# Patient Record
Sex: MALE | Race: WHITE
[De-identification: names, ages, dates, MRNs, and addresses within clinical notes are randomized per-mention and may not be internally consistent; named-entity substitution may affect disease eponyms.]

---

## 2022-03-18 PROBLEM — B35.1 OM (ONYCHOMYCOSIS): Status: ACTIVE | Noted: 2020-01-20

## 2022-03-19 PROBLEM — E78.5 HLD (HYPERLIPIDEMIA): Status: ACTIVE | Noted: 2021-08-31

## 2022-03-19 PROBLEM — L70.9 ACNE: Status: ACTIVE | Noted: 2020-01-20

## 2022-03-19 PROBLEM — R55 VASOVAGAL SYMPTOM: Status: ACTIVE | Noted: 2020-01-20

## 2022-03-19 PROBLEM — Z00.00 ANNUAL PHYSICAL EXAM: Status: ACTIVE | Noted: 2018-04-03

## 2022-03-20 PROBLEM — Z80.42 FAMILY HISTORY OF PROSTATE CANCER IN FATHER: Status: ACTIVE | Noted: 2018-04-26

## 2022-03-20 PROBLEM — I10 HTN (HYPERTENSION): Status: ACTIVE | Noted: 2018-04-03

## 2022-03-24 PROBLEM — M19.90 OA (OSTEOARTHRITIS): Status: ACTIVE | Noted: 2022-03-22

## 2022-03-24 PROBLEM — E66.9 OBESITY (BMI 30.0-34.9): Status: ACTIVE | Noted: 2022-03-22

## 2022-03-24 PROBLEM — E66.811 OBESITY (BMI 30.0-34.9): Status: ACTIVE | Noted: 2022-03-22

## 2022-09-15 DIAGNOSIS — I10 HYPERTENSION, UNSPECIFIED TYPE: ICD-10-CM

## 2022-09-15 DIAGNOSIS — E78.5 HYPERLIPIDEMIA, UNSPECIFIED HYPERLIPIDEMIA TYPE: ICD-10-CM

## 2022-09-15 DIAGNOSIS — I10 HYPERTENSION, UNSPECIFIED TYPE: Primary | ICD-10-CM

## 2022-09-15 LAB
ALBUMIN SERPL-MCNC: 4.1 G/DL (ref 3.5–5)
ALBUMIN/GLOB SERPL: 1.2 {RATIO} (ref 1.2–3.5)
ALP SERPL-CCNC: 61 U/L (ref 50–136)
ALT SERPL-CCNC: 35 U/L (ref 12–65)
ANION GAP SERPL CALC-SCNC: 3 MMOL/L (ref 4–13)
AST SERPL-CCNC: 14 U/L (ref 15–37)
BILIRUB SERPL-MCNC: 0.5 MG/DL (ref 0.2–1.1)
BUN SERPL-MCNC: 11 MG/DL (ref 6–23)
CALCIUM SERPL-MCNC: 9.5 MG/DL (ref 8.3–10.4)
CHLORIDE SERPL-SCNC: 106 MMOL/L (ref 101–110)
CHOLEST SERPL-MCNC: 126 MG/DL
CO2 SERPL-SCNC: 28 MMOL/L (ref 21–32)
CREAT SERPL-MCNC: 0.9 MG/DL (ref 0.8–1.5)
GLOBULIN SER CALC-MCNC: 3.3 G/DL (ref 2.3–3.5)
GLUCOSE SERPL-MCNC: 98 MG/DL (ref 65–100)
HDLC SERPL-MCNC: 31 MG/DL (ref 40–60)
HDLC SERPL: 4.1 {RATIO}
LDLC SERPL CALC-MCNC: 59 MG/DL
POTASSIUM SERPL-SCNC: 4.1 MMOL/L (ref 3.5–5.1)
PROT SERPL-MCNC: 7.4 G/DL (ref 6.3–8.2)
SODIUM SERPL-SCNC: 137 MMOL/L (ref 138–145)
TRIGL SERPL-MCNC: 180 MG/DL (ref 35–150)
VLDLC SERPL CALC-MCNC: 36 MG/DL (ref 6–23)

## 2022-09-22 ENCOUNTER — TELEMEDICINE (OUTPATIENT)
Dept: FAMILY MEDICINE CLINIC | Facility: CLINIC | Age: 37
End: 2022-09-22
Payer: OTHER GOVERNMENT

## 2022-09-22 DIAGNOSIS — Z12.5 SCREENING FOR PROSTATE CANCER: ICD-10-CM

## 2022-09-22 DIAGNOSIS — Z80.42 FAMILY HISTORY OF PROSTATE CANCER IN FATHER: ICD-10-CM

## 2022-09-22 DIAGNOSIS — E78.5 HYPERLIPIDEMIA, UNSPECIFIED HYPERLIPIDEMIA TYPE: ICD-10-CM

## 2022-09-22 DIAGNOSIS — I10 HYPERTENSION, UNSPECIFIED TYPE: Primary | ICD-10-CM

## 2022-09-22 DIAGNOSIS — M15.9 OSTEOARTHRITIS OF MULTIPLE JOINTS, UNSPECIFIED OSTEOARTHRITIS TYPE: ICD-10-CM

## 2022-09-22 PROCEDURE — 99214 OFFICE O/P EST MOD 30 MIN: CPT | Performed by: FAMILY MEDICINE

## 2022-09-22 RX ORDER — LISINOPRIL AND HYDROCHLOROTHIAZIDE 12.5; 1 MG/1; MG/1
1 TABLET ORAL DAILY
Qty: 90 TABLET | Refills: 1 | Status: SHIPPED | OUTPATIENT
Start: 2022-09-22

## 2022-09-22 RX ORDER — MELOXICAM 15 MG/1
15 TABLET ORAL DAILY PRN
Qty: 30 TABLET | Refills: 5
Start: 2022-09-22

## 2022-09-22 RX ORDER — AZELASTINE 1 MG/ML
SPRAY, METERED NASAL
COMMUNITY
Start: 2020-03-04

## 2022-09-22 NOTE — PROGRESS NOTES
[address]  Phone: [phone]  Fax: [phone]  [email or web address]      Encounter Info  Fernando Richardson; Established patient 40 y.o.male; seen 9/22/2022 for: Hypertension      Assessment & Plan    1. Hypertension, unspecified type  -     lisinopril-hydroCHLOROthiazide (PRINZIDE;ZESTORETIC) 10-12.5 MG per tablet; Take 1 tablet by mouth daily, Disp-90 tablet, R-1Normal  -     Comprehensive Metabolic Panel; Future  2. Osteoarthritis of multiple joints, unspecified osteoarthritis type  -     meloxicam (MOBIC) 15 MG tablet; Take 1 tablet by mouth daily as needed for Pain, Disp-30 tablet, R-5NO PRINT  3. Hyperlipidemia, unspecified hyperlipidemia type  -     Lipid Panel; Future  4. Family history of prostate cancer in father  -     PSA Screening; Future  5. Screening for prostate cancer  -     PSA Screening; Future    Problem and/or Symptoms are currently stable and/or improving on current treatment plan. Will continue and have patient follow up as directed. Pertinent labs reviewed & pertinent meds refilled X 6 M      Check Out Instructions  Return in about 6 months (around 3/22/2023) for Physical, Previsit Labs. Subjective & Objective    HPI  Patient states that chronic health problems are stable on current regimens and has no acute concerns today except as mentioned. Review of Systems     Physical Exam  Patient-Reported Vitals 9/22/2022   Patient-Reported Weight 225   Patient-Reported Height 5'10\"   Patient-Reported Systolic 349   Patient-Reported Diastolic 75   Patient-Reported Pulse -   Patient-Reported Temperature -   Patient-Reported SpO2 -   Patient-Reported Peak Flow -       BP Readings from Last 3 Encounters:   03/22/22 118/73     Wt Readings from Last 3 Encounters:   03/22/22 229 lb 12.8 oz (104.2 kg)     There is no height or weight on file to calculate BMI.      We discussed the typical prognosis and potential complications of the concern(s), including treatment options. Medication risks, benefits, costs, interactions, and alternatives were discussed as appropriate. I advised him to contact the office if his condition worsens or fails to improve as anticipated. He expressed understanding with the discussion and plan of care. Amie Black, was evaluated through a synchronous (real-time) audio and/or video encounter. The patient (or guardian if applicable) is aware that this is a billable service, which includes applicable co-pays. This Virtual Visit was conducted with patient's (and/or legal guardian's) consent. The visit was conducted pursuant to the emergency declaration under the [address] [place] [address], [address] [place] [address] authority and the [employer] General Act. Patient identification was verified, and a caregiver was present when appropriate. The patient was located at Home: [place] [address]. Provider was located at [facility] (Appt Dept): [id number] ,  [address]. An electronic signature was used to authenticate this note.   -- Toro Gilbert MD

## 2022-12-08 DIAGNOSIS — I10 HYPERTENSION, UNSPECIFIED TYPE: ICD-10-CM

## 2022-12-08 RX ORDER — LISINOPRIL AND HYDROCHLOROTHIAZIDE 12.5; 1 MG/1; MG/1
1 TABLET ORAL DAILY
Qty: 90 TABLET | Refills: 0 | Status: SHIPPED | OUTPATIENT
Start: 2022-12-08

## 2023-03-17 DIAGNOSIS — I10 HYPERTENSION, UNSPECIFIED TYPE: ICD-10-CM

## 2023-03-17 NOTE — TELEPHONE ENCOUNTER
Pt requesting a refill on medication, I will notify him that he need to schedule his pre-lab and CPX.

## 2023-03-20 RX ORDER — LISINOPRIL AND HYDROCHLOROTHIAZIDE 12.5; 1 MG/1; MG/1
1 TABLET ORAL DAILY
Qty: 30 TABLET | Refills: 0 | Status: SHIPPED | OUTPATIENT
Start: 2023-03-20

## 2023-04-19 ENCOUNTER — TELEMEDICINE (OUTPATIENT)
Dept: FAMILY MEDICINE CLINIC | Facility: CLINIC | Age: 38
End: 2023-04-19
Payer: OTHER GOVERNMENT

## 2023-04-19 DIAGNOSIS — I10 HYPERTENSION, UNSPECIFIED TYPE: Primary | ICD-10-CM

## 2023-04-19 DIAGNOSIS — E78.5 HYPERLIPIDEMIA, UNSPECIFIED HYPERLIPIDEMIA TYPE: ICD-10-CM

## 2023-04-19 DIAGNOSIS — M15.9 OSTEOARTHRITIS OF MULTIPLE JOINTS, UNSPECIFIED OSTEOARTHRITIS TYPE: ICD-10-CM

## 2023-04-19 DIAGNOSIS — J30.9 ALLERGIC RHINITIS, UNSPECIFIED SEASONALITY, UNSPECIFIED TRIGGER: ICD-10-CM

## 2023-04-19 PROCEDURE — 99214 OFFICE O/P EST MOD 30 MIN: CPT | Performed by: FAMILY MEDICINE

## 2023-04-19 RX ORDER — MELOXICAM 15 MG/1
15 TABLET ORAL DAILY PRN
Qty: 30 TABLET | Refills: 5 | Status: CANCELLED | OUTPATIENT
Start: 2023-04-19

## 2023-04-19 RX ORDER — AZELASTINE 1 MG/ML
1 SPRAY, METERED NASAL 2 TIMES DAILY
Qty: 30 ML | Refills: 5 | Status: SHIPPED | OUTPATIENT
Start: 2023-04-19

## 2023-04-19 RX ORDER — LISINOPRIL AND HYDROCHLOROTHIAZIDE 12.5; 1 MG/1; MG/1
1 TABLET ORAL DAILY
Qty: 90 TABLET | Refills: 1 | Status: SHIPPED | OUTPATIENT
Start: 2023-04-19

## 2023-04-19 NOTE — PROGRESS NOTES
12/31/19 227 lb 9.6 oz (103.2 kg)     There is no height or weight on file to calculate BMI. PHQ-9  3/22/2022   Little interest or pleasure in doing things -   Little interest or pleasure in doing things 0   Feeling down, depressed, or hopeless -   PHQ-2 Score -   Total Score PHQ 2 0   PHQ-9 Total Score -        We discussed the typical prognosis and potential complications of the concern(s), including treatment options. Medication risks, benefits, costs, interactions, and alternatives were discussed as appropriate. I advised him to contact the office if his condition worsens or fails to improve as anticipated. He expressed understanding with the discussion and plan of care. Félix Granado, was evaluated through a synchronous (real-time) audio and/or video encounter. The patient (or guardian if applicable) is aware that this is a billable service, which includes applicable co-pays. This Virtual Visit was conducted with patient's (and/or legal guardian's) consent. The visit was conducted pursuant to the emergency declaration under the [address] [place] [address], [address] [place] [address] authority and the [employer] General Act. Patient identification was verified, and a caregiver was present when appropriate. The patient was located at Home: [place] [address]. Provider was located at [facility] (Appt Dept): [id number]  [facility]. An electronic signature was used to authenticate this note.   -- Ghanshyam Hui MD

## 2023-04-19 NOTE — PATIENT INSTRUCTIONS

## 2023-10-10 DIAGNOSIS — E78.5 HYPERLIPIDEMIA, UNSPECIFIED HYPERLIPIDEMIA TYPE: ICD-10-CM

## 2023-10-10 DIAGNOSIS — I10 HYPERTENSION, UNSPECIFIED TYPE: ICD-10-CM

## 2023-10-10 LAB
ALBUMIN SERPL-MCNC: 4.1 G/DL (ref 3.5–5)
ALBUMIN/GLOB SERPL: 1.1 (ref 0.4–1.6)
ALP SERPL-CCNC: 62 U/L (ref 50–136)
ALT SERPL-CCNC: 43 U/L (ref 12–65)
ANION GAP SERPL CALC-SCNC: 9 MMOL/L (ref 2–11)
AST SERPL-CCNC: 18 U/L (ref 15–37)
BILIRUB SERPL-MCNC: 0.9 MG/DL (ref 0.2–1.1)
BUN SERPL-MCNC: 9 MG/DL (ref 6–23)
CALCIUM SERPL-MCNC: 9.3 MG/DL (ref 8.3–10.4)
CHLORIDE SERPL-SCNC: 106 MMOL/L (ref 101–110)
CHOLEST SERPL-MCNC: 145 MG/DL
CO2 SERPL-SCNC: 26 MMOL/L (ref 21–32)
CREAT SERPL-MCNC: 1 MG/DL (ref 0.8–1.5)
GLOBULIN SER CALC-MCNC: 3.8 G/DL (ref 2.8–4.5)
GLUCOSE SERPL-MCNC: 107 MG/DL (ref 65–100)
HDLC SERPL-MCNC: 29 MG/DL (ref 40–60)
HDLC SERPL: 5
LDLC SERPL CALC-MCNC: 78.8 MG/DL
POTASSIUM SERPL-SCNC: 3.9 MMOL/L (ref 3.5–5.1)
PROT SERPL-MCNC: 7.9 G/DL (ref 6.3–8.2)
SODIUM SERPL-SCNC: 141 MMOL/L (ref 133–143)
TRIGL SERPL-MCNC: 186 MG/DL (ref 35–150)
VLDLC SERPL CALC-MCNC: 37.2 MG/DL (ref 6–23)

## 2023-10-16 ASSESSMENT — PATIENT HEALTH QUESTIONNAIRE - PHQ9
1. LITTLE INTEREST OR PLEASURE IN DOING THINGS: NOT AT ALL
SUM OF ALL RESPONSES TO PHQ QUESTIONS 1-9: 0
2. FEELING DOWN, DEPRESSED OR HOPELESS: NOT AT ALL
SUM OF ALL RESPONSES TO PHQ QUESTIONS 1-9: 0
SUM OF ALL RESPONSES TO PHQ QUESTIONS 1-9: 0
SUM OF ALL RESPONSES TO PHQ9 QUESTIONS 1 & 2: 0
2. FEELING DOWN, DEPRESSED OR HOPELESS: 0
SUM OF ALL RESPONSES TO PHQ9 QUESTIONS 1 & 2: 0
SUM OF ALL RESPONSES TO PHQ QUESTIONS 1-9: 0
1. LITTLE INTEREST OR PLEASURE IN DOING THINGS: 0

## 2023-10-19 ENCOUNTER — OFFICE VISIT (OUTPATIENT)
Dept: FAMILY MEDICINE CLINIC | Facility: CLINIC | Age: 38
End: 2023-10-19
Payer: OTHER GOVERNMENT

## 2023-10-19 VITALS
HEART RATE: 73 BPM | TEMPERATURE: 97.3 F | BODY MASS INDEX: 32.73 KG/M2 | SYSTOLIC BLOOD PRESSURE: 105 MMHG | WEIGHT: 228.6 LBS | DIASTOLIC BLOOD PRESSURE: 62 MMHG | HEIGHT: 70 IN

## 2023-10-19 DIAGNOSIS — R73.02 IGT (IMPAIRED GLUCOSE TOLERANCE): ICD-10-CM

## 2023-10-19 DIAGNOSIS — I10 HYPERTENSION, UNSPECIFIED TYPE: ICD-10-CM

## 2023-10-19 DIAGNOSIS — E78.5 HYPERLIPIDEMIA, UNSPECIFIED HYPERLIPIDEMIA TYPE: ICD-10-CM

## 2023-10-19 DIAGNOSIS — J30.9 ALLERGIC RHINITIS, UNSPECIFIED SEASONALITY, UNSPECIFIED TRIGGER: ICD-10-CM

## 2023-10-19 DIAGNOSIS — Z00.00 ANNUAL PHYSICAL EXAM: Primary | ICD-10-CM

## 2023-10-19 DIAGNOSIS — E66.9 OBESITY (BMI 30.0-34.9): ICD-10-CM

## 2023-10-19 PROCEDURE — 99395 PREV VISIT EST AGE 18-39: CPT | Performed by: FAMILY MEDICINE

## 2023-10-19 PROCEDURE — 99214 OFFICE O/P EST MOD 30 MIN: CPT | Performed by: FAMILY MEDICINE

## 2023-10-19 PROCEDURE — 3078F DIAST BP <80 MM HG: CPT | Performed by: FAMILY MEDICINE

## 2023-10-19 PROCEDURE — 3074F SYST BP LT 130 MM HG: CPT | Performed by: FAMILY MEDICINE

## 2023-10-19 RX ORDER — LISINOPRIL AND HYDROCHLOROTHIAZIDE 12.5; 1 MG/1; MG/1
1 TABLET ORAL DAILY
Qty: 100 TABLET | Refills: 1 | Status: SHIPPED | OUTPATIENT
Start: 2023-10-19

## 2023-10-19 RX ORDER — AZELASTINE 1 MG/ML
1 SPRAY, METERED NASAL 2 TIMES DAILY
Qty: 30 ML | Refills: 5 | Status: CANCELLED | OUTPATIENT
Start: 2023-10-19

## 2023-10-19 SDOH — ECONOMIC STABILITY: HOUSING INSECURITY
IN THE LAST 12 MONTHS, WAS THERE A TIME WHEN YOU DID NOT HAVE A STEADY PLACE TO SLEEP OR SLEPT IN A SHELTER (INCLUDING NOW)?: NO

## 2023-10-19 SDOH — ECONOMIC STABILITY: FOOD INSECURITY: WITHIN THE PAST 12 MONTHS, THE FOOD YOU BOUGHT JUST DIDN'T LAST AND YOU DIDN'T HAVE MONEY TO GET MORE.: NEVER TRUE

## 2023-10-19 SDOH — ECONOMIC STABILITY: FOOD INSECURITY: WITHIN THE PAST 12 MONTHS, YOU WORRIED THAT YOUR FOOD WOULD RUN OUT BEFORE YOU GOT MONEY TO BUY MORE.: NEVER TRUE

## 2023-10-19 SDOH — ECONOMIC STABILITY: INCOME INSECURITY: HOW HARD IS IT FOR YOU TO PAY FOR THE VERY BASICS LIKE FOOD, HOUSING, MEDICAL CARE, AND HEATING?: NOT HARD AT ALL

## 2023-10-19 ASSESSMENT — ENCOUNTER SYMPTOMS
BLOOD IN STOOL: 0
COLOR CHANGE: 0
SHORTNESS OF BREATH: 0
CONSTIPATION: 0
DIARRHEA: 0
ABDOMINAL PAIN: 0

## 2023-10-19 NOTE — ASSESSMENT & PLAN NOTE
Problem and/or Symptoms are currently stable and/or improving on current treatment plan. Will continue and have patient follow up as directed. For a low cholesterol diet, tried to avoid or decrease your consumption of 3 types of foods: 1. Red Meat  2. Shellfish  3. Fried Foods. These 3 types are typically the highest in cholesterol concentration. Other than that, focus on eating a healthy diet rich in fresh vegetables, lean meats, and engage in regular exercise. This will help to achieve or maintain a healthy body weight, which also helps keep high cholesterol under control.

## 2023-10-19 NOTE — PROGRESS NOTES
Use:  reports that he quit smoking about 14 years ago. His smoking use included cigarettes. He started smoking about 15 years ago. He has never used smokeless tobacco. He reports current alcohol use of about 3.0 standard drinks of alcohol per week. He reports that he does not use drugs. STD's (Yearly): declines  Bone Density (Age 65+ if female): na  AAA Screen (Age 70-76 if male & ever smoked): na    Other Providers Seen  Patient Care Team:  Katheryn Hinton MD as PCP - General  Katheryn Hinton MD as PCP - Empaneled Provider    Risk Factors  Risk factors (depression, fall risk, smoking, poor nutrition, physical inactivity, obesity, etc) were identified and appropriate counseling given and/or referrals were made as appropriate. If no counseling given and/or referrals were made then no risk factors as listed were identified or there is already a treatment plan in place to address said risk factors. The patient's chart was reviewed and updated as appropriate including: problem list, current medications, past medical, surgical, & social history, family history, allergies, & care teams.

## 2023-10-19 NOTE — ASSESSMENT & PLAN NOTE
Problem and/or Symptoms are currently stable and/or improving on current treatment plan. Will continue and have patient follow up as directed.     Cont using OTC meds & Astelin NS PRN

## 2023-10-19 NOTE — PATIENT INSTRUCTIONS

## 2024-01-18 ENCOUNTER — TELEPHONE (OUTPATIENT)
Dept: FAMILY MEDICINE CLINIC | Facility: CLINIC | Age: 39
End: 2024-01-18

## 2024-01-18 NOTE — TELEPHONE ENCOUNTER
----- Message from Chasidy Muhammad sent at 1/18/2024 10:16 AM EST -----  Subject: Message to Provider    QUESTIONS  Information for Provider? Patient states that he has had two issues with   lab billing and both times Cyn Aragon in the office was able to help   him. States it is now happening again and wondering if maybe he could   speak with her and if she would be able to help again since she is   familiar with the problem.  ---------------------------------------------------------------------------  --------------  CALL BACK INFO  [phone]; OK to leave message on voicemail  ---------------------------------------------------------------------------  --------------  SCRIPT ANSWERS  Relationship to Patient? Self

## 2024-04-12 ENCOUNTER — NURSE ONLY (OUTPATIENT)
Dept: FAMILY MEDICINE CLINIC | Facility: CLINIC | Age: 39
End: 2024-04-12

## 2024-04-12 VITALS
HEIGHT: 70 IN | SYSTOLIC BLOOD PRESSURE: 117 MMHG | TEMPERATURE: 98.2 F | DIASTOLIC BLOOD PRESSURE: 65 MMHG | BODY MASS INDEX: 32.67 KG/M2 | HEART RATE: 59 BPM | WEIGHT: 228.2 LBS

## 2024-04-12 DIAGNOSIS — R73.02 IGT (IMPAIRED GLUCOSE TOLERANCE): ICD-10-CM

## 2024-04-12 DIAGNOSIS — Z01.30 BLOOD PRESSURE CHECK: Primary | ICD-10-CM

## 2024-04-12 DIAGNOSIS — I10 HYPERTENSION, UNSPECIFIED TYPE: ICD-10-CM

## 2024-04-12 LAB
ALBUMIN SERPL-MCNC: 4.1 G/DL (ref 3.5–5)
ALBUMIN/GLOB SERPL: 1.1 (ref 0.4–1.6)
ALP SERPL-CCNC: 61 U/L (ref 50–136)
ALT SERPL-CCNC: 41 U/L (ref 12–65)
ANION GAP SERPL CALC-SCNC: 5 MMOL/L (ref 2–11)
AST SERPL-CCNC: 21 U/L (ref 15–37)
BILIRUB SERPL-MCNC: 0.9 MG/DL (ref 0.2–1.1)
BUN SERPL-MCNC: 8 MG/DL (ref 6–23)
CALCIUM SERPL-MCNC: 9.6 MG/DL (ref 8.3–10.4)
CHLORIDE SERPL-SCNC: 104 MMOL/L (ref 103–113)
CO2 SERPL-SCNC: 27 MMOL/L (ref 21–32)
CREAT SERPL-MCNC: 1 MG/DL (ref 0.8–1.5)
EST. AVERAGE GLUCOSE BLD GHB EST-MCNC: 103 MG/DL
GLOBULIN SER CALC-MCNC: 3.7 G/DL (ref 2.8–4.5)
GLUCOSE SERPL-MCNC: 99 MG/DL (ref 65–100)
HBA1C MFR BLD: 5.2 % (ref 4.8–5.6)
POTASSIUM SERPL-SCNC: 3.8 MMOL/L (ref 3.5–5.1)
PROT SERPL-MCNC: 7.8 G/DL (ref 6.3–8.2)
SODIUM SERPL-SCNC: 136 MMOL/L (ref 136–146)

## 2024-04-19 ENCOUNTER — TELEMEDICINE (OUTPATIENT)
Dept: FAMILY MEDICINE CLINIC | Facility: CLINIC | Age: 39
End: 2024-04-19
Payer: OTHER GOVERNMENT

## 2024-04-19 DIAGNOSIS — R73.02 IGT (IMPAIRED GLUCOSE TOLERANCE): ICD-10-CM

## 2024-04-19 DIAGNOSIS — E78.5 HYPERLIPIDEMIA, UNSPECIFIED HYPERLIPIDEMIA TYPE: ICD-10-CM

## 2024-04-19 DIAGNOSIS — I10 HYPERTENSION, UNSPECIFIED TYPE: Primary | ICD-10-CM

## 2024-04-19 PROCEDURE — 99214 OFFICE O/P EST MOD 30 MIN: CPT | Performed by: FAMILY MEDICINE

## 2024-04-19 RX ORDER — LISINOPRIL AND HYDROCHLOROTHIAZIDE 12.5; 1 MG/1; MG/1
1 TABLET ORAL DAILY
Qty: 100 TABLET | Refills: 1 | Status: SHIPPED | OUTPATIENT
Start: 2024-04-19

## 2024-04-19 NOTE — PROGRESS NOTES
[address] [address]  Phone: [phone]  Fax: [phone]  Email: [email or web address]      Encounter Info  Ash Barone; Established patient 39 y.o.male; seen 4/19/2024 for: Hypertension      Assessment & Plan    1. Hypertension, unspecified type  -     lisinopril-hydroCHLOROthiazide (PRINZIDE;ZESTORETIC) 10-12.5 MG per tablet; Take 1 tablet by mouth daily, Disp-100 tablet, R-1Normal  -     Comprehensive Metabolic Panel; Future  2. IGT (impaired glucose tolerance)  -     Hemoglobin A1C; Future  -     Comprehensive Metabolic Panel; Future  3. Hyperlipidemia, unspecified hyperlipidemia type  -     Comprehensive Metabolic Panel; Future  -     Lipid Panel; Future    Problem and/or Symptoms are currently stable and/or improving on current treatment plan. Will continue and have patient follow up as directed.    Pertinent labs reviewed/pending; pertinent meds RF X 6 M      Check Out Instructions  Return in about 6 months (around 10/19/2024) for IP: Annual Exam, Previsit Labs (add Vitals if Virtual).      Subjective & Objective    HPI  Patient states that chronic health problems are stable on current regimens and has no acute concerns today except as mentioned.      Review of Systems     Physical Exam      4/18/2024     4:33 PM   Patient-Reported Vitals   Patient-Reported Weight 225   Patient-Reported Height 5’10”   Patient-Reported Systolic 117 mmHg   Patient-Reported Diastolic 65 mmHg   Patient-Reported Pulse 59       BP Readings from Last 3 Encounters:   04/12/24 117/65   10/19/23 105/62   03/22/22 118/73     There is no height or weight on file to calculate BMI.    Wt Readings from Last 3 Encounters:   04/12/24 103.5 kg (228 lb 3.2 oz)   10/19/23 103.7 kg (228 lb 9.6 oz)   03/22/22 104.2 kg (229 lb 12.8 oz)           10/16/2023     7:32 PM   PHQ-9    Little interest or pleasure in doing things 0   Feeling down, depressed, or hopeless 0   PHQ-2 Score 0   PHQ-9 Total

## 2024-04-19 NOTE — PATIENT INSTRUCTIONS
Continue taking all the medications on this list as directed; this list is current and please discontinue any medications not on this list. Please call the office or use \"My Chart\" online to request medication refills prior to running out.    - PLEASE NOTE: when needing to schedule a visit you should send our office a [employer] message requesting a visit (or call the office if you can't use [employer]) and we will send you a scheduling ticket that will include open dates & times. You can then use this feature to select your preferred date & time for an office visit, virtual visit, and/or lab visit. Please make sure to respond to this scheduling ticket ASAP when received.     Please do NOT schedule any visit through the [employer] self-scheduling feature; any self-scheduling should only be done using the scheduling ticket provided to you directly from our office. Also, please do NOT schedule any visits through our out of state call center. If you ever need to speak to someone at our office directly please follow these instructions: when you call our office number, [phone], and get the phone tree options, press \"Option 2\" first & then \"Option 1\". This combination should take you directly to someone at our office and bypass the out of state call center.

## 2024-11-20 ENCOUNTER — LAB (OUTPATIENT)
Dept: FAMILY MEDICINE CLINIC | Facility: CLINIC | Age: 39
End: 2024-11-20

## 2024-11-20 DIAGNOSIS — E78.5 HYPERLIPIDEMIA, UNSPECIFIED HYPERLIPIDEMIA TYPE: ICD-10-CM

## 2024-11-20 DIAGNOSIS — R73.02 IGT (IMPAIRED GLUCOSE TOLERANCE): ICD-10-CM

## 2024-11-20 DIAGNOSIS — I10 HYPERTENSION, UNSPECIFIED TYPE: ICD-10-CM

## 2024-11-20 LAB
ALBUMIN SERPL-MCNC: 4.2 G/DL (ref 3.5–5)
ALBUMIN/GLOB SERPL: 1.3 (ref 1–1.9)
ALP SERPL-CCNC: 60 U/L (ref 40–129)
ALT SERPL-CCNC: 43 U/L (ref 8–55)
ANION GAP SERPL CALC-SCNC: 12 MMOL/L (ref 7–16)
AST SERPL-CCNC: 36 U/L (ref 15–37)
BILIRUB SERPL-MCNC: 0.6 MG/DL (ref 0–1.2)
BUN SERPL-MCNC: 11 MG/DL (ref 6–23)
CALCIUM SERPL-MCNC: 9.4 MG/DL (ref 8.8–10.2)
CHLORIDE SERPL-SCNC: 101 MMOL/L (ref 98–107)
CHOLEST SERPL-MCNC: 133 MG/DL (ref 0–200)
CO2 SERPL-SCNC: 24 MMOL/L (ref 20–29)
CREAT SERPL-MCNC: 0.84 MG/DL (ref 0.8–1.3)
EST. AVERAGE GLUCOSE BLD GHB EST-MCNC: 114 MG/DL
GLOBULIN SER CALC-MCNC: 3.3 G/DL (ref 2.3–3.5)
GLUCOSE SERPL-MCNC: 99 MG/DL (ref 70–99)
HBA1C MFR BLD: 5.6 % (ref 0–5.6)
HDLC SERPL-MCNC: 31 MG/DL (ref 40–60)
HDLC SERPL: 4.3 (ref 0–5)
LDLC SERPL CALC-MCNC: 73 MG/DL (ref 0–100)
POTASSIUM SERPL-SCNC: 4 MMOL/L (ref 3.5–5.1)
PROT SERPL-MCNC: 7.5 G/DL (ref 6.3–8.2)
SODIUM SERPL-SCNC: 137 MMOL/L (ref 136–145)
TRIGL SERPL-MCNC: 146 MG/DL (ref 0–150)
VLDLC SERPL CALC-MCNC: 29 MG/DL (ref 6–23)

## 2024-11-25 ENCOUNTER — OFFICE VISIT (OUTPATIENT)
Dept: FAMILY MEDICINE CLINIC | Facility: CLINIC | Age: 39
End: 2024-11-25

## 2024-11-25 VITALS
DIASTOLIC BLOOD PRESSURE: 80 MMHG | RESPIRATION RATE: 19 BRPM | HEIGHT: 70 IN | TEMPERATURE: 97.7 F | OXYGEN SATURATION: 98 % | WEIGHT: 233 LBS | BODY MASS INDEX: 33.36 KG/M2 | SYSTOLIC BLOOD PRESSURE: 110 MMHG | HEART RATE: 64 BPM

## 2024-11-25 DIAGNOSIS — Z00.00 ANNUAL PHYSICAL EXAM: Primary | ICD-10-CM

## 2024-11-25 DIAGNOSIS — I10 HYPERTENSION, UNSPECIFIED TYPE: ICD-10-CM

## 2024-11-25 DIAGNOSIS — R73.02 IGT (IMPAIRED GLUCOSE TOLERANCE): ICD-10-CM

## 2024-11-25 DIAGNOSIS — M15.9 OSTEOARTHRITIS OF MULTIPLE JOINTS, UNSPECIFIED OSTEOARTHRITIS TYPE: ICD-10-CM

## 2024-11-25 DIAGNOSIS — E78.5 HYPERLIPIDEMIA, UNSPECIFIED HYPERLIPIDEMIA TYPE: ICD-10-CM

## 2024-11-25 RX ORDER — LISINOPRIL AND HYDROCHLOROTHIAZIDE 10; 12.5 MG/1; MG/1
1 TABLET ORAL DAILY
Qty: 100 TABLET | Refills: 3 | Status: SHIPPED | OUTPATIENT
Start: 2024-11-25

## 2024-11-25 SDOH — ECONOMIC STABILITY: INCOME INSECURITY: HOW HARD IS IT FOR YOU TO PAY FOR THE VERY BASICS LIKE FOOD, HOUSING, MEDICAL CARE, AND HEATING?: NOT HARD AT ALL

## 2024-11-25 SDOH — ECONOMIC STABILITY: FOOD INSECURITY: WITHIN THE PAST 12 MONTHS, THE FOOD YOU BOUGHT JUST DIDN'T LAST AND YOU DIDN'T HAVE MONEY TO GET MORE.: NEVER TRUE

## 2024-11-25 SDOH — ECONOMIC STABILITY: FOOD INSECURITY: WITHIN THE PAST 12 MONTHS, YOU WORRIED THAT YOUR FOOD WOULD RUN OUT BEFORE YOU GOT MONEY TO BUY MORE.: NEVER TRUE

## 2024-11-25 ASSESSMENT — ENCOUNTER SYMPTOMS
DIARRHEA: 0
BLOOD IN STOOL: 0
COLOR CHANGE: 0
ABDOMINAL PAIN: 0
SHORTNESS OF BREATH: 0
CONSTIPATION: 0

## 2024-11-25 ASSESSMENT — PATIENT HEALTH QUESTIONNAIRE - PHQ9
SUM OF ALL RESPONSES TO PHQ9 QUESTIONS 1 & 2: 0
SUM OF ALL RESPONSES TO PHQ QUESTIONS 1-9: 0
1. LITTLE INTEREST OR PLEASURE IN DOING THINGS: NOT AT ALL
1. LITTLE INTEREST OR PLEASURE IN DOING THINGS: NOT AT ALL
SUM OF ALL RESPONSES TO PHQ QUESTIONS 1-9: 0
SUM OF ALL RESPONSES TO PHQ9 QUESTIONS 1 & 2: 0
SUM OF ALL RESPONSES TO PHQ QUESTIONS 1-9: 0
SUM OF ALL RESPONSES TO PHQ QUESTIONS 1-9: 0
2. FEELING DOWN, DEPRESSED OR HOPELESS: NOT AT ALL
2. FEELING DOWN, DEPRESSED OR HOPELESS: NOT AT ALL

## 2024-11-25 NOTE — ASSESSMENT & PLAN NOTE
Problem and/or Symptoms are currently stable and/or improving on current treatment plan. Will continue and have patient follow up as directed.    Pertinent labs reviewed/pending; pertinent meds RF X 1 Y

## 2024-11-25 NOTE — ASSESSMENT & PLAN NOTE
Problem and/or Symptoms are currently stable and/or improving on current treatment plan. Will continue and have patient follow up as directed.    Cont to monitor & discussed low sugar/carb diet with patient

## 2024-11-25 NOTE — PATIENT INSTRUCTIONS
Continue taking all the medications on this list as directed; this list is current and please discontinue any medications not on this list. Please call the office or use \"My Chart\" online to request medication refills prior to running out.    - PLEASE NOTE: when needing to schedule a visit you should send our office a [employer] message requesting a visit (or call the office if you can't use [employer]) and we will send you a scheduling ticket that will include open dates & times. You can then use this feature to select your preferred date & time for an office visit, virtual visit, and/or lab visit. Please make sure to respond to this scheduling ticket ASAP when received.     Please do NOT schedule any visit through the [employer] self-scheduling feature; any self-scheduling should only be done using the scheduling ticket provided to you directly from our office. Also, please do NOT schedule any visits through our out of state call center. If you ever need to speak to someone at our office directly please follow these instructions: when you call our office number, [phone], and get the phone tree options, press \"Option 2\" first & then \"Option 1\". This combination should take you directly to someone at our office and bypass the out of state call center.

## 2024-11-25 NOTE — ASSESSMENT & PLAN NOTE
Problem and/or Symptoms are currently stable and/or improving on current treatment plan. Will continue and have patient follow up as directed.    Cont OTC NSAID's PRN